# Patient Record
Sex: FEMALE | Race: WHITE | URBAN - NONMETROPOLITAN AREA
[De-identification: names, ages, dates, MRNs, and addresses within clinical notes are randomized per-mention and may not be internally consistent; named-entity substitution may affect disease eponyms.]

---

## 2022-08-24 ENCOUNTER — OFFICE VISIT (OUTPATIENT)
Dept: NEUROSURGERY | Age: 67
End: 2022-08-24
Payer: COMMERCIAL

## 2022-08-24 VITALS
DIASTOLIC BLOOD PRESSURE: 69 MMHG | HEIGHT: 60 IN | TEMPERATURE: 98.9 F | BODY MASS INDEX: 36.91 KG/M2 | SYSTOLIC BLOOD PRESSURE: 125 MMHG | WEIGHT: 188 LBS | HEART RATE: 51 BPM | OXYGEN SATURATION: 100 %

## 2022-08-24 DIAGNOSIS — G44.89 OTHER HEADACHE SYNDROME: Primary | ICD-10-CM

## 2022-08-24 DIAGNOSIS — R42 DIZZINESS: ICD-10-CM

## 2022-08-24 DIAGNOSIS — R41.3 MEMORY CHANGE: ICD-10-CM

## 2022-08-24 PROCEDURE — 1036F TOBACCO NON-USER: CPT | Performed by: NURSE PRACTITIONER

## 2022-08-24 PROCEDURE — 99204 OFFICE O/P NEW MOD 45 MIN: CPT | Performed by: NURSE PRACTITIONER

## 2022-08-24 PROCEDURE — 1123F ACP DISCUSS/DSCN MKR DOCD: CPT | Performed by: NURSE PRACTITIONER

## 2022-08-24 PROCEDURE — G8417 CALC BMI ABV UP PARAM F/U: HCPCS | Performed by: NURSE PRACTITIONER

## 2022-08-24 PROCEDURE — G8400 PT W/DXA NO RESULTS DOC: HCPCS | Performed by: NURSE PRACTITIONER

## 2022-08-24 PROCEDURE — G8427 DOCREV CUR MEDS BY ELIG CLIN: HCPCS | Performed by: NURSE PRACTITIONER

## 2022-08-24 PROCEDURE — 3017F COLORECTAL CA SCREEN DOC REV: CPT | Performed by: NURSE PRACTITIONER

## 2022-08-24 PROCEDURE — 1090F PRES/ABSN URINE INCON ASSESS: CPT | Performed by: NURSE PRACTITIONER

## 2022-08-24 RX ORDER — PROPRANOLOL HYDROCHLORIDE 160 MG/1
160 CAPSULE, EXTENDED RELEASE ORAL DAILY
COMMUNITY
Start: 2022-02-14

## 2022-08-24 RX ORDER — MECLIZINE HYDROCHLORIDE 25 MG/1
TABLET ORAL
COMMUNITY
Start: 2022-08-01

## 2022-08-24 RX ORDER — HYDROXYZINE PAMOATE 50 MG/1
CAPSULE ORAL
COMMUNITY
Start: 2022-03-17

## 2022-08-24 RX ORDER — CALCIUM CARBONATE/VITAMIN D3 600 MG-10
TABLET ORAL DAILY
COMMUNITY

## 2022-08-24 RX ORDER — ATORVASTATIN CALCIUM 10 MG/1
TABLET, FILM COATED ORAL
COMMUNITY
Start: 2022-08-11

## 2022-08-24 RX ORDER — ALPRAZOLAM 0.25 MG/1
1 TABLET ORAL 2 TIMES DAILY
COMMUNITY
Start: 2022-05-26

## 2022-08-24 RX ORDER — ROPINIROLE 1 MG/1
TABLET, FILM COATED ORAL
COMMUNITY
Start: 2022-04-22

## 2022-08-24 RX ORDER — UBROGEPANT 100 MG/1
TABLET ORAL
Qty: 16 TABLET | Refills: 3 | Status: SHIPPED | OUTPATIENT
Start: 2022-08-24

## 2022-08-24 RX ORDER — FLUOXETINE HYDROCHLORIDE 20 MG/1
CAPSULE ORAL
COMMUNITY
Start: 2022-06-08

## 2022-08-24 RX ORDER — TORSEMIDE 20 MG/1
20 TABLET ORAL DAILY
COMMUNITY
Start: 2021-11-23

## 2022-11-22 ENCOUNTER — OFFICE VISIT (OUTPATIENT)
Dept: NEUROLOGY | Age: 67
End: 2022-11-22
Payer: MEDICARE

## 2022-11-22 VITALS
WEIGHT: 188 LBS | OXYGEN SATURATION: 92 % | DIASTOLIC BLOOD PRESSURE: 81 MMHG | BODY MASS INDEX: 36.91 KG/M2 | SYSTOLIC BLOOD PRESSURE: 137 MMHG | HEIGHT: 60 IN | HEART RATE: 56 BPM

## 2022-11-22 DIAGNOSIS — R53.83 OTHER FATIGUE: ICD-10-CM

## 2022-11-22 DIAGNOSIS — F07.81 POST CONCUSSIVE SYNDROME: ICD-10-CM

## 2022-11-22 DIAGNOSIS — F07.81 POST CONCUSSIVE SYNDROME: Primary | ICD-10-CM

## 2022-11-22 LAB
ALBUMIN SERPL-MCNC: 4.9 G/DL (ref 3.5–5.2)
ALP BLD-CCNC: 110 U/L (ref 35–104)
ALT SERPL-CCNC: 24 U/L (ref 5–33)
ANION GAP SERPL CALCULATED.3IONS-SCNC: 15 MMOL/L (ref 7–19)
AST SERPL-CCNC: 24 U/L (ref 5–32)
BASOPHILS ABSOLUTE: 0.1 K/UL (ref 0–0.2)
BASOPHILS RELATIVE PERCENT: 0.8 % (ref 0–1)
BILIRUB SERPL-MCNC: 0.5 MG/DL (ref 0.2–1.2)
BUN BLDV-MCNC: 35 MG/DL (ref 8–23)
CALCIUM SERPL-MCNC: 9.9 MG/DL (ref 8.8–10.2)
CHLORIDE BLD-SCNC: 100 MMOL/L (ref 98–111)
CO2: 26 MMOL/L (ref 22–29)
CREAT SERPL-MCNC: 1.1 MG/DL (ref 0.5–0.9)
EOSINOPHILS ABSOLUTE: 0.4 K/UL (ref 0–0.6)
EOSINOPHILS RELATIVE PERCENT: 5.8 % (ref 0–5)
GFR SERPL CREATININE-BSD FRML MDRD: 55 ML/MIN/{1.73_M2}
GLUCOSE BLD-MCNC: 89 MG/DL (ref 74–109)
HCT VFR BLD CALC: 48.1 % (ref 37–47)
HEMOGLOBIN: 14.9 G/DL (ref 12–16)
HIV-1 P24 AG: NORMAL
IMMATURE GRANULOCYTES #: 0 K/UL
LYMPHOCYTES ABSOLUTE: 2.3 K/UL (ref 1.1–4.5)
LYMPHOCYTES RELATIVE PERCENT: 35.2 % (ref 20–40)
MCH RBC QN AUTO: 27.7 PG (ref 27–31)
MCHC RBC AUTO-ENTMCNC: 31 G/DL (ref 33–37)
MCV RBC AUTO: 89.6 FL (ref 81–99)
MONOCYTES ABSOLUTE: 0.5 K/UL (ref 0–0.9)
MONOCYTES RELATIVE PERCENT: 7 % (ref 0–10)
NEUTROPHILS ABSOLUTE: 3.4 K/UL (ref 1.5–7.5)
NEUTROPHILS RELATIVE PERCENT: 51 % (ref 50–65)
PDW BLD-RTO: 13.2 % (ref 11.5–14.5)
PLATELET # BLD: 270 K/UL (ref 130–400)
PMV BLD AUTO: 10.9 FL (ref 9.4–12.3)
POTASSIUM SERPL-SCNC: 4.4 MMOL/L (ref 3.5–5)
RAPID HIV 1&2: NORMAL
RBC # BLD: 5.37 M/UL (ref 4.2–5.4)
SEDIMENTATION RATE, ERYTHROCYTE: 14 MM/HR (ref 0–25)
SODIUM BLD-SCNC: 141 MMOL/L (ref 136–145)
TOTAL PROTEIN: 8.4 G/DL (ref 6.6–8.7)
TSH REFLEX FT4: 1.72 UIU/ML (ref 0.35–5.5)
VITAMIN B-12: 488 PG/ML (ref 211–946)
WBC # BLD: 6.6 K/UL (ref 4.8–10.8)

## 2022-11-22 PROCEDURE — 99213 OFFICE O/P EST LOW 20 MIN: CPT | Performed by: NURSE PRACTITIONER

## 2022-11-22 PROCEDURE — G8484 FLU IMMUNIZE NO ADMIN: HCPCS | Performed by: NURSE PRACTITIONER

## 2022-11-22 PROCEDURE — 1123F ACP DISCUSS/DSCN MKR DOCD: CPT | Performed by: NURSE PRACTITIONER

## 2022-11-22 PROCEDURE — 3017F COLORECTAL CA SCREEN DOC REV: CPT | Performed by: NURSE PRACTITIONER

## 2022-11-22 PROCEDURE — 1090F PRES/ABSN URINE INCON ASSESS: CPT | Performed by: NURSE PRACTITIONER

## 2022-11-22 PROCEDURE — G8400 PT W/DXA NO RESULTS DOC: HCPCS | Performed by: NURSE PRACTITIONER

## 2022-11-22 PROCEDURE — G8417 CALC BMI ABV UP PARAM F/U: HCPCS | Performed by: NURSE PRACTITIONER

## 2022-11-22 PROCEDURE — 1036F TOBACCO NON-USER: CPT | Performed by: NURSE PRACTITIONER

## 2022-11-22 PROCEDURE — G8427 DOCREV CUR MEDS BY ELIG CLIN: HCPCS | Performed by: NURSE PRACTITIONER

## 2022-11-22 NOTE — PROGRESS NOTES
[facility] Office Note      Patient:   Abner Bello  MR#:    [id number]  Account Number:                         YOB: 1955  Date of Evaluation:  11/22/2022  Time of Note:                          12:41 PM  Primary/Referring Physician:  Leena Vazquez MD   Consulting Physician:  Heaven Olvera DNP, APRN    FOLLOW UP    Chief Complaint   Patient presents with    Follow-up     C/o blurred vision at times lasting a few seconds and loss of balance. Headache and slurred speech seems to be better    Headache     HISTORY OF PRESENT ILLNESS    Abner Bello is a 78y.o. year old female here for follow up of dizziness, falls, gait changes, headaches. Has noted some improvement in speech but otherwise largely unchanged. She has noted these symptoms increasing since May 2022, had a fall and struck her head on the nightstand. No clear LOC. Headaches are about the same. Headache pain is posterior with little radiation of pain. She denies nausea, vomiting, light/sound sensitivity. Denies vision changes with headaches. Denies focal symptoms with headaches.  has noted slurred speech with more severe headaches. She tried [place] and [place] but no benefit. She will take Ibuprofen for milder headaches and Tylenol #3 for more severe headaches. On Propranolol for chronic migraine history. She has tried and failed Imitrex but had a severe reaction to this. Dizziness has improved but continues to feel quite imbalanced. She has had 1-2 falls since last visit. Has had some memory concerns as well. She veers to the left when imbalanced. Does not correlate dizziness with imbalance. Continues to note memory changes. Primarily short term memory affected. Might forget her thought in the middle of talking, word finding difficulty. Feels like she gets lost in the conversation sometimes. No issues with ADLs. Taking medications without difficulty. No family history of memory loss.  She had a recent MRI brain that was c/w , atrophy, nothing acute. Past Medical History:   Diagnosis Date    Hyperlipidemia        Past Surgical History:   Procedure Laterality Date    APPENDECTOMY      GASTRIC MOTILITY STUDY      HYSTERECTOMY, VAGINAL         History reviewed. No pertinent family history. Social History     Socioeconomic History    Marital status: Unknown     Spouse name: Not on file    Number of children: Not on file    Years of education: Not on file    Highest education level: Not on file   Occupational History    Not on file   Tobacco Use    Smoking status: Never    Smokeless tobacco: Never   Vaping Use    Vaping Use: Never used   Substance and Sexual Activity    Alcohol use: Not Currently    Drug use: Never    Sexual activity: Not on file   Other Topics Concern    Not on file   Social History Narrative    Not on file     Social Determinants of Health     Financial Resource Strain: Not on file   Food Insecurity: Not on file   Transportation Needs: Not on file   Physical Activity: Not on file   Stress: Not on file   Social Connections: Not on file   Intimate Partner Violence: Not on file   Housing Stability: Not on file       Current Outpatient Medications   Medication Sig Dispense Refill    propranolol (INDERAL LA) 160 MG extended release capsule Take 160 mg by mouth daily      FLUoxetine (PROZAC) 20 MG capsule TAKE 1 CAPSULE BY MOUTH TWICE DAILY      atorvastatin (LIPITOR) 10 MG tablet TAKE 1 TABLET (10 MG TOTAL) BY MOUTH DAILY      ALPRAZolam (XANAX) 0.25 MG tablet Take 1 tablet by mouth 2 times daily.       Multiple Vitamin (ONCE DAILY) TABS Take 1 tablet by mouth daily      rOPINIRole (REQUIP) 1 MG tablet TAKE 1 TABLET BY MOUTH NIGHTLY      meclizine (ANTIVERT) 25 MG tablet TAKE 1 TABLET (25 MG TOTAL) BY MOUTH 2 (TWO) TIMES A DAY AS NEEDED FOR DIZZINESS      hydrOXYzine pamoate (VISTARIL) 50 MG capsule TAKE 1 CAPSULE BY MOUTH THREE TIMES A DAY AS NEEDED      torsemide (DEMADEX) 20 MG tablet Take 20 mg by mouth daily No current facility-administered medications for this visit. Allergies   Allergen Reactions    Sulfa Antibiotics Hives      -     Lorazepam      depression   -       Meclofenamate       -      REVIEW OF SYSTEMS  Constitutional: []Fever []Sweats []Chills [] Recent Injury [x] Denies all unless marked  HEENT:[x]Headache  [] Head Injury [] Hearing Loss  [] Sore Throat  [] Ear Ache [x] Denies all unless marked  Spine:  [] Neck pain  [] Back pain  [] Sciaticia  [x] Denies all unless marked  Cardiovascular:[]Heart Disease []Palpitations [] Chest Pain   [x] Denies all unless marked  Pulmonary: []Shortness of Breath []Cough   [x] Denies all unless marked  Psychiatric/Behavioral:[] Depression [x] Anxiety [x] Denies all unless marked  Gastrointestinal: []Nausea  []Vomiting  []Abdominal Pain  []Constipation  []Diarrhea  [x] Denies all unless marked  Genitourinary:   [] Frequency  [] Urgency  [] Dysuria [] Incontinence  [x] Denies all unless marked  Extremities: []Pain  []Swelling  [x] Denies all unless marked  Musculoskeletal: [] Myalgias  [] Joint Pain  [] Arthritis [] Muscle Cramps [] Muscle Twitches  [x] Denies all unless marked  Sleep: []Insomnia[]Snoring []Restless Legs  []Sleep Apnea  []Daytime Sleepiness  [x] Denies all unless marked  Skin:[] Rash [] Color Change [x] Denies all unless marked   Neurological:[]Visual Disturbance [] Memory Loss []Loss of Balance []Slurred Speech []Weakness []Seizures  [] Dizziness [x] Denies all unless marked     The MA has completed the ROS with the patient. I have reviewed it in its' entirety with the patient and agree with the documentation.      PHYSICAL EXAM  /81   Pulse 56   Ht 5' (1.524 m)   Wt 188 lb (85.3 kg)   SpO2 92%   BMI 36.72 kg/m²       Constitutional - No acute distress    HEENT- Conjunctiva normal.  No scars, masses, or lesions over external nose or ears, no neck masses noted, no jugular vein distension, no bruit  Cardiac- Regular rate and rhythm  Pulmonary- Good expansion, normal effort without use of accessory muscles  Musculoskeletal - No significant wasting of muscles noted, no bony deformities  Extremities - No clubbing, cyanosis or edema  Skin - Warm, dry, and intact. No rash, erythema, or pallor  Psychiatric - Mood, affect, and behavior appear normal      NEUROLOGICAL EXAM     Mental status   [x] Awake, alert, oriented   [x]Affect attention and concentration appear appropriate  [x]Recent and remote memory appears unremarkable  [x]Speech normal without dysarthria or aphasia, comprehension and repetition intact. COMMENTS:    Cranial Nerves [x]No VF deficit to confrontation,  no papilledema on fundoscopic exam.  [x]PERRLA, EOMI, no nystagmus, conjugate eye movements, no ptosis  [x]Face symmetric  [x]Facial sensation intact  [x]Tongue midline no atrophy or fasciculations present  [x]Palate midline, hearing to finger rub normal bilaterally  [x]Shoulder shrug and SCM testing normal bilaterally  COMMENTS:   Motor   [x]5/5 strength x 4 extremities  [x]Normal bulk and tone  [x]No tremor present  [x]No rigidity or bradykinesia noted  COMMENTS:   Sensory  [x]Sensation intact to light touch, pin prick, vibration, and proprioception BLE  [x]Sensation intact to light touch, pin prick, vibration, and proprioception BUE  COMMENTS:   Coordination [x]FTN normal bilaterally   [x]HTS normal bilaterally  [x]PLACIDO normal bilaterally.    COMMENTS:   Reflexes  [x]Symmetric and non-pathological  [x]Toes down going bilaterally  [x]No clonus present  COMMENTS:   Gait                  [x]Normal steady gait    []Ataxic    []Spastic     []Magnetic     []Shuffling  COMMENTS:       LABS RECORD AND IMAGING REVIEW (As below and per HPI)    Lab Results   Component Value Date    [id number] 488 11/22/2022     Lab Results   Component Value Date    WBC 6.6 11/22/2022    HGB 14.9 11/22/2022    HCT 48.1 (H) 11/22/2022    MCV 89.6 11/22/2022     11/22/2022     Lab Results Component Value Date     2022    K 4.4 2022     2022    CO2 26 2022    BUN 35 (H) 2022    CREATININE 1.1 (H) 2022    GLUCOSE 89 2022    CALCIUM 9.9 2022    PROT 8.4 2022    LABALBU 4.9 2022    BILITOT 0.5 2022    ALKPHOS 110 (H) 2022    AST 24 2022    ALT 24 2022    LABGLOM 55 (A) 2022     No results found for: CHOL, TRIG, HDL, LDLCALC  No results found for: TSH, T4FREE  Lab Results   Component Value Date    SEDRATE 14 2022        MRI brain (2022)- ; mild to moderate atrophy; partially empty sella     Reviewed referral records     ASSESSMENT:    Cisco Chan is a 78y.o. year old female here for follow up of headaches, dizziness, memory changes and abnormal MRI brain. She has a history of headaches. These along with the other symptoms worsened after a fall in May 2022. MRI brain with , partially empty sella. Suspect this is a benign finding given her age and lack of other symptoms. Symptoms are most suggestive of post concussive syndrome. Should improve with time although timeline is unclear. Will add additional labs today to ensure no other source to memory changes. Could consider adding alternative headache medication if no improvement. Patient is leaving later this week for an [place] trip so will hold off on this for now, patient hesitant to start new medication. ICD-10-CM    1. Post concussive syndrome  F07.81 CBC with Auto Differential     Comprehensive Metabolic Panel     HIV Screen     Vitamin B1, Whole Blood     Vitamin B12     TSH with Reflex to FT4     Sedimentation Rate     RPR      2. Other fatigue   R53.83 HIV Screen     TSH with Reflex to FT4        PLAN:   Additional labs as listed above   Continue Propranolol, long term med for migraine prophylaxis. Continue OTC medications or Tylenol #3 prn. No concern for medication overuse   Follow along clinically.  Discussed post concussion, symptoms will improve with time but timeline is unclear. 5.   Return in about 3 months (around 2/22/2023) for follow up, sooner if worsening.     Dianah Carrel DNP, APRN

## 2022-11-23 ENCOUNTER — TELEPHONE (OUTPATIENT)
Dept: NEUROLOGY | Age: 67
End: 2022-11-23

## 2022-11-23 NOTE — TELEPHONE ENCOUNTER
----- Message from MAMADOU Solano sent at 11/22/2022  4:20 PM CST -----  Kidney function is altered, needs to follow up with primary. I do not have any prior labs to compare to. Several other labs are pending.

## 2022-11-23 NOTE — TELEPHONE ENCOUNTER
Called patient per EG to give her theKidney function is altered, needs to follow up with primary. I do not have any prior labs to compare to. Several other labs are pending. results from recent labs also to follow up with her PCP   Would call with pending labs once they were resulted. Patient understood.

## 2022-11-24 LAB — RPR: NORMAL

## 2022-11-27 LAB — VITAMIN B1 WHOLE BLOOD: 198 NMOL/L (ref 70–180)

## 2022-12-01 ENCOUNTER — TELEPHONE (OUTPATIENT)
Dept: NEUROLOGY | Age: 67
End: 2022-12-01

## 2022-12-01 NOTE — TELEPHONE ENCOUNTER
----- Message from Adra Schirmer, APRN sent at 11/22/2022  4:20 PM CST -----  Kidney function is altered, needs to follow up with primary. I do not have any prior labs to compare to. Several other labs are pending.

## 2023-02-22 ENCOUNTER — OFFICE VISIT (OUTPATIENT)
Dept: NEUROLOGY | Age: 68
End: 2023-02-22
Payer: MEDICARE

## 2023-02-22 VITALS
HEART RATE: 55 BPM | OXYGEN SATURATION: 96 % | BODY MASS INDEX: 38.09 KG/M2 | SYSTOLIC BLOOD PRESSURE: 114 MMHG | DIASTOLIC BLOOD PRESSURE: 76 MMHG | HEIGHT: 60 IN | WEIGHT: 194 LBS

## 2023-02-22 DIAGNOSIS — R51.9 NONINTRACTABLE HEADACHE, UNSPECIFIED CHRONICITY PATTERN, UNSPECIFIED HEADACHE TYPE: ICD-10-CM

## 2023-02-22 DIAGNOSIS — F07.81 POST CONCUSSIVE SYNDROME: Primary | ICD-10-CM

## 2023-02-22 PROCEDURE — 3017F COLORECTAL CA SCREEN DOC REV: CPT | Performed by: NURSE PRACTITIONER

## 2023-02-22 PROCEDURE — 1123F ACP DISCUSS/DSCN MKR DOCD: CPT | Performed by: NURSE PRACTITIONER

## 2023-02-22 PROCEDURE — G8400 PT W/DXA NO RESULTS DOC: HCPCS | Performed by: NURSE PRACTITIONER

## 2023-02-22 PROCEDURE — 1036F TOBACCO NON-USER: CPT | Performed by: NURSE PRACTITIONER

## 2023-02-22 PROCEDURE — G8484 FLU IMMUNIZE NO ADMIN: HCPCS | Performed by: NURSE PRACTITIONER

## 2023-02-22 PROCEDURE — G8417 CALC BMI ABV UP PARAM F/U: HCPCS | Performed by: NURSE PRACTITIONER

## 2023-02-22 PROCEDURE — 99213 OFFICE O/P EST LOW 20 MIN: CPT | Performed by: NURSE PRACTITIONER

## 2023-02-22 PROCEDURE — G8427 DOCREV CUR MEDS BY ELIG CLIN: HCPCS | Performed by: NURSE PRACTITIONER

## 2023-02-22 PROCEDURE — 1090F PRES/ABSN URINE INCON ASSESS: CPT | Performed by: NURSE PRACTITIONER

## 2023-02-22 RX ORDER — MELATONIN 10 MG
10 CAPSULE ORAL NIGHTLY PRN
COMMUNITY

## 2023-02-22 RX ORDER — IBUPROFEN 200 MG
200 TABLET ORAL EVERY 6 HOURS PRN
COMMUNITY

## 2023-02-22 RX ORDER — DIPHENHYDRAMINE HCL 25 MG
25 CAPSULE ORAL EVERY 6 HOURS PRN
COMMUNITY

## 2023-02-22 RX ORDER — ACETAMINOPHEN AND CODEINE PHOSPHATE 300; 30 MG/1; MG/1
TABLET ORAL
COMMUNITY
Start: 2023-01-12

## 2023-02-22 NOTE — PROGRESS NOTES
REVIEW OF SYSTEMS    Constitutional: []Fever []Sweat []Chills [] Recent Injury [x] Denies all unless marked  HEENT:[x]Headache  [] Head Injury/Hearing Loss  [] Sore Throat  [] Ear Ache/Dizziness  [x] Denies all unless marked  Spine:  [] Neck pain  [] Back pain  [] Sciaticia  [x] Denies all unless marked  Cardiovascular:[]Heart Disease []Chest Pain [] Palpitations  [x] Denies all unless marked  Pulmonary: []Shortness of Breath []Cough   [x] Denies all unless marke  Gastrointestinal: [x]Nausea  []Vomiting  []Abdominal Pain  []Constipation  []Diarrhea  []Dark Bloody Stools  [x] Denies all unless marked  Psychiatric/Behavioral:[] Depression [] Anxiety [x] Denies all unless marked  Genitourinary:   [] Frequency  [] Urgency  [] Incontinence [] Pain with Urination  [x] Denies all unless marked  Extremities: []Pain  [x]Swelling  [x] Denies all unless marked  Musculoskeletal: [] Muscle Pain  [] Joint Pain  [] Arthritis [] Muscle Cramps [] Muscle Twitches  [x] Denies all unless marked  Sleep: [] Insomnia [] Snoring [] Restless Legs [] Sleep Apnea  [] Daytime Sleepiness  [x] Denies all unless marked  Skin:[] Rash [] Skin Discoloration [x] Denies all unless marked   Neurological: []Visual Disturbance/Memory Loss [] Loss of Balance [] Slurred Speech/Weakness [] Seizures  [] Vertigo/Dizziness [x] Denies all unless marked

## 2023-02-22 NOTE — PROGRESS NOTES
[id number] [facility]rology Office Note      Patient:   Arabella Diaz  MR#:    [id number]  Account Number:                         YOB: 1955  Date of Evaluation:  2/22/2023  Time of Note:                          11:09 AM  Primary/Referring Physician:  Graham Steven MD   Consulting Physician:  MAMADOU Acosta    FOLLOW UP    Chief Complaint   Patient presents with    Follow-up     Post concussive syndrome       HISTORY OF PRESENT ILLNESS    Arabella Diaz is a 78y.o. year old female here for follow up of post concussive syndrome. She is here today with , is followed by MAMADOU Hernandez. Both her and  report that she is greatly improved overall. She is no longer having issues with dizziness, falling, gait issues. Headaches are well controlled on propranolol 160 mg extended release. She reports a very mild headache a few times a week at most, is easily aborted by Tylenol 3 or ibuprofen. She also has had resolution of speech issues as well. Feels memory issues are improved and overall stable. All of the symptoms started in May 2022 after a fall. Headache characteristics are unchanged. There is not vision changes with headaches, no focal symptoms, she denies any nausea, vomiting, photophobia or phonophobia. She tried [place] and [place] before with no benefit. Has had reaction to Imitrex in the past.  Prior MRI brain noted small vessel disease, atrophy, no acute pathology. Past Medical History:   Diagnosis Date    Hyperlipidemia        Past Surgical History:   Procedure Laterality Date    APPENDECTOMY      GASTRIC MOTILITY STUDY      HYSTERECTOMY, VAGINAL         History reviewed. No pertinent family history.     Social History     Socioeconomic History    Marital status: Unknown     Spouse name: Not on file    Number of children: Not on file    Years of education: Not on file    Highest education level: Not on file   Occupational History    Not on file   Tobacco Use    Smoking status: Never    Smokeless tobacco: Never   Vaping Use    Vaping Use: Never used   Substance and Sexual Activity    Alcohol use: Not Currently    Drug use: Never    Sexual activity: Not on file   Other Topics Concern    Not on file   Social History Narrative    Not on file     Social Determinants of Health     Financial Resource Strain: Not on file   Food Insecurity: Not on file   Transportation Needs: Not on file   Physical Activity: Not on file   Stress: Not on file   Social Connections: Not on file   Intimate Partner Violence: Not on file   Housing Stability: Not on file       Current Outpatient Medications   Medication Sig Dispense Refill    acetaminophen-codeine (TYLENOL #3) 300-30 MG per tablet TAKE 1 TABLET BY MOUTH BY MOUTH EVERY 6 HOURS AS NEEDED FOR MODERATE PAIN (MAY CAUSE DROWSINESS)      vitamin D (CHOLECALCIFEROL) 125 MCG (5000 UT) CAPS capsule Take 10,000 Units by mouth daily      diphenhydrAMINE (BENADRYL) 25 MG capsule Take 25 mg by mouth every 6 hours as needed      ibuprofen (ADVIL;MOTRIN) 200 MG tablet Take 200 mg by mouth every 6 hours as needed      melatonin 10 MG CAPS capsule Take 10 mg by mouth nightly as needed      propranolol (INDERAL LA) 160 MG extended release capsule Take 160 mg by mouth daily      FLUoxetine (PROZAC) 20 MG capsule TAKE 1 CAPSULE BY MOUTH TWICE DAILY      atorvastatin (LIPITOR) 10 MG tablet TAKE 1 TABLET (10 MG TOTAL) BY MOUTH DAILY      ALPRAZolam (XANAX) 0.25 MG tablet Take 1 tablet by mouth 2 times daily.       Multiple Vitamin (ONCE DAILY) TABS Take 1 tablet by mouth daily      rOPINIRole (REQUIP) 1 MG tablet TAKE 1 TABLET BY MOUTH NIGHTLY      meclizine (ANTIVERT) 25 MG tablet TAKE 1 TABLET (25 MG TOTAL) BY MOUTH 2 (TWO) TIMES A DAY AS NEEDED FOR DIZZINESS      hydrOXYzine pamoate (VISTARIL) 50 MG capsule TAKE 1 CAPSULE BY MOUTH THREE TIMES A DAY AS NEEDED      torsemide (DEMADEX) 20 MG tablet Take 20 mg by mouth daily       No current facility-administered medications for this visit. Allergies   Allergen Reactions    Sulfa Antibiotics Hives      -     Lorazepam      depression   -       Meclofenamate Other (See Comments)      -    -    -          REVIEW OF SYSTEMS  Constitutional: []Fever []Sweat []Chills [] Recent Injury [x] Denies all unless marked  HEENT:[x]Headache  [] Head Injury/Hearing Loss  [] Sore Throat  [] Ear Ache/Dizziness  [x] Denies all unless marked  Spine:  [] Neck pain  [] Back pain  [] Sciaticia  [x] Denies all unless marked  Cardiovascular:[]Heart Disease []Chest Pain [] Palpitations  [x] Denies all unless marked  Pulmonary: []Shortness of Breath []Cough   [x] Denies all unless marke  Gastrointestinal: [x]Nausea  []Vomiting  []Abdominal Pain  []Constipation  []Diarrhea  []Dark Bloody Stools  [x] Denies all unless marked  Psychiatric/Behavioral:[] Depression [] Anxiety [x] Denies all unless marked  Genitourinary:   [] Frequency  [] Urgency  [] Incontinence [] Pain with Urination  [x] Denies all unless marked  Extremities: []Pain  [x]Swelling  [x] Denies all unless marked  Musculoskeletal: [] Muscle Pain  [] Joint Pain  [] Arthritis [] Muscle Cramps [] Muscle Twitches  [x] Denies all unless marked  Sleep: [] Insomnia [] Snoring [] Restless Legs [] Sleep Apnea  [] Daytime Sleepiness  [x] Denies all unless marked  Skin:[] Rash [] Skin Discoloration [x] Denies all unless marked   Neurological: []Visual Disturbance/Memory Loss [] Loss of Balance [] Slurred Speech/Weakness [] Seizures  [] Vertigo/Dizziness [x] Denies all unless marked    The MA has completed the ROS with the patient. I have reviewed it in its' entirety with the patient and agree with the documentation.      PHYSICAL EXAM  /76   Pulse 55   Ht 5' (1.524 m)   Wt 194 lb (88 kg)   SpO2 96%   BMI 37.89 kg/m²       Constitutional - No acute distress    HEENT- Conjunctiva normal.  No scars, masses, or lesions over external nose or ears, no neck masses noted, no jugular vein distension, no bruit  Cardiac- Regular rate and rhythm  Pulmonary- Good expansion, normal effort without use of accessory muscles  Musculoskeletal - No significant wasting of muscles noted, no bony deformities  Extremities - No clubbing, cyanosis or edema  Skin - Warm, dry, and intact. No rash, erythema, or pallor  Psychiatric - Mood, affect, and behavior appear normal      NEUROLOGICAL EXAM     Mental status   [x] Awake, alert, oriented   [x]Affect attention and concentration appear appropriate  [x]Recent and remote memory appears unremarkable  [x]Speech normal without dysarthria or aphasia, comprehension and repetition intact. COMMENTS:    Cranial Nerves [x]No VF deficit to confrontation  [x]PERRLA, EOMI, no nystagmus, conjugate eye movements, no ptosis  [x]Face symmetric  [x]Facial sensation intact  [x]Tongue midline no atrophy or fasciculations present  [x]Palate midline, hearing to finger rub normal bilaterally  [x]Shoulder shrug and SCM testing normal bilaterally  COMMENTS:   Motor   [x]5/5 strength x 4 extremities  [x]Normal bulk and tone  [x]No tremor present  [x]No rigidity or bradykinesia noted  COMMENTS:   Sensory  [x]Sensation intact to light touch, vibration, and proprioception BLE  [x]Sensation intact to light touch, vibration, and proprioception BUE  COMMENTS:   Coordination [x]FTN normal bilaterally   []HTS normal bilaterally  [x]PLACIDO normal bilaterally.    COMMENTS:   Reflexes  [x]Symmetric and non-pathological  []Toes down going bilaterally  [x]No clonus present  COMMENTS:   Gait                  [x]Normal steady gait    []Ataxic    []Spastic     []Magnetic     []Shuffling  COMMENTS:       LABS RECORD AND IMAGING REVIEW (As below and per HPI)    Lab Results   Component Value Date    [id number] 488 11/22/2022     Lab Results   Component Value Date    WBC 6.6 11/22/2022    HGB 14.9 11/22/2022    HCT 48.1 (H) 11/22/2022    MCV 89.6 11/22/2022     11/22/2022     Lab Results   Component Value Date     2022    K 4.4 2022     2022    CO2 26 2022    BUN 35 (H) 2022    CREATININE 1.1 (H) 2022    GLUCOSE 89 2022    CALCIUM 9.9 2022    PROT 8.4 2022    LABALBU 4.9 2022    BILITOT 0.5 2022    ALKPHOS 110 (H) 2022    AST 24 2022    ALT 24 2022    LABGLOM 55 (A) 2022     No results found for: CHOL, TRIG, HDL, LDLCALC  No results found for: TSH, T4FREE  Lab Results   Component Value Date    SEDRATE 14 2022          Reviewed records     ASSESSMENT:    You La is a 78y.o. year old female here for follow-up of postconcussive syndrome status post fall in May 2022. Here today with . She feels that she has improved overall. Has had overall resolution of majority of her symptoms. Is still having some mild headaches, 1 to 2-week at most that are easily aborted with either ibuprofen or Tylenol 3. She is still on propranolol for migraine headache prevention, no side effect issues with this and has benefit noted. Heart rate is in the 50s today, she is asymptomatic. Discussed this with her. We will just need to monitor heart rate. Prior MRI brain with , partially empty sella. Suspect this is a benign finding given her age and lack of other symptoms. Labs completed at prior visit were largely normal.  There was some abnormal renal labs, she is following up with primary care regarding this. ICD-10-CM    1. Post concussive syndrome  F07.81       2. Nonintractable headache, unspecified chronicity pattern, unspecified headache type  R51.9             PLAN:  Continue propranolol as prescribed. Monitor for issues related to bradycardia. Continue Tylenol #3 as needed, use sparingly. Follow-up with primary care regarding renal labs. 4. Return in about 6 months (around 2023) for Follow up, sooner with worsening symptoms, Schedule with Jaren Peterson.     This dictation was generated by voice recognition computer software. Although all attempts were made to edit the dictation for accuracy, there may be errors in the transcription that are not intended.   Sandra Sahu, APRN

## 2023-03-06 ENCOUNTER — TELEPHONE (OUTPATIENT)
Dept: NEUROLOGY | Age: 68
End: 2023-03-06

## 2023-03-06 DIAGNOSIS — R47.89 WORD FINDING DIFFICULTY: Primary | ICD-10-CM

## 2023-03-06 NOTE — TELEPHONE ENCOUNTER
Lets repeat MRI brain to ensure nothing new is going on. Orders placed. If it happens again then may need to consider ER evaluation. Sooner follow up after MRI.

## 2023-03-06 NOTE — TELEPHONE ENCOUNTER
Called pt gave her an update on plan. She voiced understanding and thanked us for the call. Pt is aware if anymore episodes she will got to the ER.  Pt transferred to scheduling for MRI

## 2023-03-06 NOTE — TELEPHONE ENCOUNTER
Patient called complaining of having another episode yesterday. Patient reports, not being able to speak and get words out. Patient states that she was walking into things on her right side.  Patient is concerned and wanted to make Radha Gone aware and see if she has any advise

## 2023-03-07 NOTE — TELEPHONE ENCOUNTER
Patient l/m today stating that her MRI is not scheduled her for another couple of weeks. She would like to see if she can obtain her MRI at a facility in [facility], she states they can squeeze her in this Thursday at 6pm if it's ok to get done there. Would need her order faxed and if pre-cert is needed that would have to be updated or obtained asap. She would like a call back to discuss.  Fax number for the facility she wants to use  is [phone]

## 2023-03-08 NOTE — TELEPHONE ENCOUNTER
Spoke with Sasha Peters this morning to let her know that I would work on getting Authorization for her to have MRI at [facility] in [facility], and if I was to have any problems I would call her back. Sasha Peters voiced understanding.

## 2023-03-08 NOTE — TELEPHONE ENCOUNTER
Spoke with Umm Williamson to let her know that I faxed MRI Order to [facility] and that she was all set for her appointment for 3/9/2023. Umm Williamson asked me to cancel the MRI that is scheduled at [facility] for 3/28/2023.  I have cancelled appointment per patient request

## 2023-03-16 ENCOUNTER — TELEPHONE (OUTPATIENT)
Dept: NEUROLOGY | Age: 68
End: 2023-03-16

## 2023-03-29 ENCOUNTER — OFFICE VISIT (OUTPATIENT)
Dept: NEUROLOGY | Age: 68
End: 2023-03-29
Payer: MEDICARE

## 2023-03-29 VITALS
SYSTOLIC BLOOD PRESSURE: 128 MMHG | DIASTOLIC BLOOD PRESSURE: 80 MMHG | BODY MASS INDEX: 38.09 KG/M2 | OXYGEN SATURATION: 95 % | HEART RATE: 53 BPM | HEIGHT: 60 IN | WEIGHT: 194 LBS

## 2023-03-29 DIAGNOSIS — G45.9 TIA (TRANSIENT ISCHEMIC ATTACK): Primary | ICD-10-CM

## 2023-03-29 DIAGNOSIS — I67.841 REVERSIBLE CEREBROVASCULAR VASOCONSTRICTION SYNDROME: ICD-10-CM

## 2023-03-29 PROCEDURE — G8427 DOCREV CUR MEDS BY ELIG CLIN: HCPCS | Performed by: NURSE PRACTITIONER

## 2023-03-29 PROCEDURE — 1123F ACP DISCUSS/DSCN MKR DOCD: CPT | Performed by: NURSE PRACTITIONER

## 2023-03-29 PROCEDURE — G8417 CALC BMI ABV UP PARAM F/U: HCPCS | Performed by: NURSE PRACTITIONER

## 2023-03-29 PROCEDURE — G8484 FLU IMMUNIZE NO ADMIN: HCPCS | Performed by: NURSE PRACTITIONER

## 2023-03-29 PROCEDURE — 99214 OFFICE O/P EST MOD 30 MIN: CPT | Performed by: NURSE PRACTITIONER

## 2023-03-29 PROCEDURE — 1036F TOBACCO NON-USER: CPT | Performed by: NURSE PRACTITIONER

## 2023-03-29 PROCEDURE — 3017F COLORECTAL CA SCREEN DOC REV: CPT | Performed by: NURSE PRACTITIONER

## 2023-03-29 PROCEDURE — 1090F PRES/ABSN URINE INCON ASSESS: CPT | Performed by: NURSE PRACTITIONER

## 2023-03-29 PROCEDURE — G8400 PT W/DXA NO RESULTS DOC: HCPCS | Performed by: NURSE PRACTITIONER

## 2023-03-29 NOTE — PROGRESS NOTES
REVIEW OF SYSTEMS    Constitutional: []Fever []Sweats []Chills [] Recent Injury [x] Denies all unless marked  HEENT:[]Headache  [] Head Injury [] Hearing Loss  [] Sore Throat  [] Ear Ache [x] Denies all unless marked  Spine:  [] Neck pain  [] Back pain  [] Sciaticia  [x] Denies all unless marked  Cardiovascular:[]Heart Disease []Palpitations [] Chest Pain   [x] Denies all unless marked  Pulmonary: []Shortness of Breath []Cough   [x] Denies all unless marked  Psychiatric/Behavioral:[] Depression [] Anxiety [x] Denies all unless marked  Gastrointestinal: []Nausea  []Vomiting  []Abdominal Pain  []Constipation  []Diarrhea  [x] Denies all unless marked  Genitourinary:   [] Frequency  [] Urgency  [] Dysuria [] Incontinence  [x] Denies all unless marked  Extremities: []Pain  []Swelling  [x] Denies all unless marked  Musculoskeletal: [] Myalgias  [] Joint Pain  [] Arthritis [] Muscle Cramps [] Muscle Twitches  [x] Denies all unless marked  Sleep: []Insomnia[]Snoring []Restless Legs  []Sleep Apnea  []Daytime Sleepiness  [x] Denies all unless marked  Skin:[] Rash [] Color Change [x] Denies all unless marked   Neurological:[]Visual Disturbance [] Memory Loss []Loss of Balance []Slurred Speech []Weakness []Seizures  [] Dizziness [x] Denies all unless marked
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 Monitor for issues related to bradycardia. Continue Tylenol #3 as needed, use sparingly. Return in about 2 months (around 5/29/2023) for follow up, sooner if worsening.     Zheng Dears DNP, APRN

## 2023-04-07 ENCOUNTER — HOSPITAL ENCOUNTER (OUTPATIENT)
Dept: NON INVASIVE DIAGNOSTICS | Age: 68
Discharge: HOME | End: 2023-04-07
Payer: MEDICARE

## 2023-04-07 ENCOUNTER — HOSPITAL ENCOUNTER (OUTPATIENT)
Dept: VASCULAR LAB | Age: 68
Discharge: HOME | End: 2023-04-07
Payer: MEDICARE

## 2023-04-07 DIAGNOSIS — G45.9 TIA (TRANSIENT ISCHEMIC ATTACK): ICD-10-CM

## 2023-04-07 DIAGNOSIS — I67.841 REVERSIBLE CEREBROVASCULAR VASOCONSTRICTION SYNDROME: ICD-10-CM

## 2023-04-07 LAB
LV EF: 60 %
LVEF MODALITY: NORMAL

## 2023-04-07 PROCEDURE — 6360000004 HC RX CONTRAST MEDICATION: Performed by: NURSE PRACTITIONER

## 2023-04-07 PROCEDURE — 93880 EXTRACRANIAL BILAT STUDY: CPT

## 2023-04-07 PROCEDURE — C8929 TTE W OR WO FOL WCON,DOPPLER: HCPCS

## 2023-04-07 PROCEDURE — 93246 EXT ECG>7D<15D RECORDING: CPT

## 2023-04-07 RX ADMIN — PERFLUTREN 1.5 ML: 6.52 INJECTION, SUSPENSION INTRAVENOUS at 08:36

## 2023-05-25 ENCOUNTER — CLINICAL DOCUMENTATION (OUTPATIENT)
Dept: NEUROLOGY | Age: 68
End: 2023-05-25

## 2023-05-25 ENCOUNTER — OFFICE VISIT (OUTPATIENT)
Dept: NEUROLOGY | Age: 68
End: 2023-05-25
Payer: MEDICARE

## 2023-05-25 VITALS
WEIGHT: 194 LBS | OXYGEN SATURATION: 100 % | DIASTOLIC BLOOD PRESSURE: 70 MMHG | HEIGHT: 60 IN | BODY MASS INDEX: 38.09 KG/M2 | HEART RATE: 51 BPM | SYSTOLIC BLOOD PRESSURE: 112 MMHG

## 2023-05-25 DIAGNOSIS — G45.9 TIA (TRANSIENT ISCHEMIC ATTACK): Primary | ICD-10-CM

## 2023-05-25 DIAGNOSIS — G44.59 HEADACHE SYNDROME, COMPLICATED: ICD-10-CM

## 2023-05-25 PROCEDURE — G8427 DOCREV CUR MEDS BY ELIG CLIN: HCPCS | Performed by: NURSE PRACTITIONER

## 2023-05-25 PROCEDURE — 1123F ACP DISCUSS/DSCN MKR DOCD: CPT | Performed by: NURSE PRACTITIONER

## 2023-05-25 PROCEDURE — G8417 CALC BMI ABV UP PARAM F/U: HCPCS | Performed by: NURSE PRACTITIONER

## 2023-05-25 PROCEDURE — 3017F COLORECTAL CA SCREEN DOC REV: CPT | Performed by: NURSE PRACTITIONER

## 2023-05-25 PROCEDURE — 99213 OFFICE O/P EST LOW 20 MIN: CPT | Performed by: NURSE PRACTITIONER

## 2023-05-25 PROCEDURE — G8400 PT W/DXA NO RESULTS DOC: HCPCS | Performed by: NURSE PRACTITIONER

## 2023-05-25 PROCEDURE — 1036F TOBACCO NON-USER: CPT | Performed by: NURSE PRACTITIONER

## 2023-05-25 PROCEDURE — 1090F PRES/ABSN URINE INCON ASSESS: CPT | Performed by: NURSE PRACTITIONER

## 2023-05-25 RX ORDER — CHOLECALCIFEROL (VITAMIN D3) 1250 MCG
CAPSULE ORAL 2 TIMES DAILY
COMMUNITY

## 2023-05-25 NOTE — PROGRESS NOTES
Procedures: MRA Head   Primary Coverage: Medicare A&B  Secondary Coverage Requires Auth:     Source:  Case/Tracking/Auth Ref #     Case Status: No Auth Required     Valid Dates if approved:      Additional Comments: Faxed to [address] [phone]  Fax# [phone]

## 2023-05-25 NOTE — PROGRESS NOTES
[facility] Neurology Office Note      Patient:   Kandice Monet  MR#:    [id number]  Account Number:                         YOB: 1955  Date of Evaluation:  5/25/2023  Time of Note:                          10:57 AM  Primary/Referring Physician:  MAMADOU Torrez - NP   Consulting Physician:  Lizz Small DNP, APRN    FOLLOW UP    Chief Complaint   Patient presents with    Follow-up    Transient Ischemic Attack     Was in hospital with TIA symptoms at the beginning of May- records in care everywhere    Headache     Headache are better      [address] is a 78y.o. year old female here for follow up. She was seen at OSH for episode of slurred speech, word finding difficulty, imbalance. Denies weakness or numbness. She did have a headache with these symptoms as well. Symptoms lasted for about an hour and then resolved. She had a similar episode in March 2023 as well- imbalanced, slurred speech, word finding difficulty. Symptoms last for several hours that time and then resolved. Unclear if she had an associated headache with this episode. Outside of these episodes she has noted improvement overall. Headaches have improved. No change in characteristics of headaches. Headache pain is left sided, retro orbital/temporal in nature. She denies vision changes. Denies focal symptoms. She denies clear migrainous features. On Propranolol for preventative. She is taking OTC medications or Tylenol #3 with improvement. She tried [place] and [place] before with no benefit. Has had reaction to Imitrex in the past. She has noted some dizziness, imbalance that will worsen when she looks up typically. She had an increase in headaches/dizziness following a fall in May 2022. No prior stroke history noted, aspirin and statin.      Past Medical History:   Diagnosis Date    Hyperlipidemia        Past Surgical History:   Procedure Laterality Date    APPENDECTOMY      GASTRIC MOTILITY STUDY

## 2023-05-30 ENCOUNTER — TELEPHONE (OUTPATIENT)
Dept: NEUROSURGERY | Age: 68
End: 2023-05-30

## 2023-05-30 NOTE — TELEPHONE ENCOUNTER
Call was placed to the patient in regards to phone tree, apologized to the patient and informed to call with any further questions. Patient understood.

## 2023-06-28 ENCOUNTER — TELEPHONE (OUTPATIENT)
Dept: NEUROLOGY | Age: 68
End: 2023-06-28

## 2023-08-22 ENCOUNTER — OFFICE VISIT (OUTPATIENT)
Dept: NEUROLOGY | Age: 68
End: 2023-08-22
Payer: MEDICARE

## 2023-08-22 VITALS — BODY MASS INDEX: 38.09 KG/M2 | HEIGHT: 60 IN | WEIGHT: 194 LBS

## 2023-08-22 DIAGNOSIS — R42 DIZZINESS: ICD-10-CM

## 2023-08-22 DIAGNOSIS — R51.9 NONINTRACTABLE HEADACHE, UNSPECIFIED CHRONICITY PATTERN, UNSPECIFIED HEADACHE TYPE: Primary | ICD-10-CM

## 2023-08-22 PROCEDURE — 3017F COLORECTAL CA SCREEN DOC REV: CPT | Performed by: NURSE PRACTITIONER

## 2023-08-22 PROCEDURE — 99213 OFFICE O/P EST LOW 20 MIN: CPT | Performed by: NURSE PRACTITIONER

## 2023-08-22 PROCEDURE — 1090F PRES/ABSN URINE INCON ASSESS: CPT | Performed by: NURSE PRACTITIONER

## 2023-08-22 PROCEDURE — 1123F ACP DISCUSS/DSCN MKR DOCD: CPT | Performed by: NURSE PRACTITIONER

## 2023-08-22 PROCEDURE — G8417 CALC BMI ABV UP PARAM F/U: HCPCS | Performed by: NURSE PRACTITIONER

## 2023-08-22 PROCEDURE — 1036F TOBACCO NON-USER: CPT | Performed by: NURSE PRACTITIONER

## 2023-08-22 PROCEDURE — G8400 PT W/DXA NO RESULTS DOC: HCPCS | Performed by: NURSE PRACTITIONER

## 2023-08-22 PROCEDURE — G8427 DOCREV CUR MEDS BY ELIG CLIN: HCPCS | Performed by: NURSE PRACTITIONER

## 2023-08-22 RX ORDER — URSODIOL 300 MG/1
300 CAPSULE ORAL 2 TIMES DAILY
COMMUNITY
Start: 2023-08-16

## 2023-08-22 RX ORDER — ONDANSETRON 4 MG/1
1 TABLET, ORALLY DISINTEGRATING ORAL PRN
COMMUNITY
Start: 2023-07-13

## 2023-08-22 RX ORDER — OMEPRAZOLE 20 MG/1
20 CAPSULE, DELAYED RELEASE ORAL EVERY MORNING
COMMUNITY
Start: 2023-08-16

## 2023-08-22 RX ORDER — ATORVASTATIN CALCIUM 40 MG/1
1 TABLET, FILM COATED ORAL DAILY
COMMUNITY
Start: 2023-06-30

## 2023-08-22 NOTE — PROGRESS NOTES
[facility] Neurology Office Note      Patient:   Josey Vera  MR#:    [id number]  Account Number:                         YOB: 1955  Date of Evaluation:  8/22/2023  Time of Note:                          9:15 AM  Primary/Referring Physician:  MAMADOU London - NP   Consulting Physician:  Serge Santana DNP, APRN    FOLLOW UP    Chief Complaint   Patient presents with    6 Month Follow-Up    Transient Ischemic Attack    Results     Results from recent [address] is a 78y.o. year old female here for follow up. Overall improved from prior visit. Denies further episodes of slurred speech, word finding difficulty and imbalance. She had a headache with these symptoms. No focal symptoms. Symptoms lasted for about an hour and then resolved. Headaches are well controlled. No change in characteristics of headaches. Headache pain is left sided, retro orbital/temporal in nature. She denies vision changes. Denies focal symptoms. She denies clear migrainous features. On Propranolol for preventative. She is taking OTC medications or Tylenol #3 with improvement. Recently tried Nurtec samples with improvement. She tried [place] before with no benefit. Has had reaction to Imitrex in the past. Noting 1 headache in a month. Notes intermittent dizziness but overall improved. Worsens when she looks up typically. She had an increase in headaches/dizziness following a fall in May 2022. No prior stroke history noted, aspirin and statin. Past Medical History:   Diagnosis Date    Hyperlipidemia        Past Surgical History:   Procedure Laterality Date    APPENDECTOMY      GASTRIC MOTILITY STUDY      HYSTERECTOMY, VAGINAL         History reviewed. No pertinent family history.     Social History     Socioeconomic History    Marital status:      Spouse name: Not on file    Number of children: Not on file    Years of education: Not on file    Highest education

## 2024-06-27 ENCOUNTER — OFFICE VISIT (OUTPATIENT)
Dept: NEUROLOGY | Age: 69
End: 2024-06-27
Payer: MEDICARE

## 2024-06-27 VITALS
HEIGHT: 60 IN | BODY MASS INDEX: 38.09 KG/M2 | DIASTOLIC BLOOD PRESSURE: 70 MMHG | OXYGEN SATURATION: 97 % | HEART RATE: 47 BPM | WEIGHT: 194 LBS | SYSTOLIC BLOOD PRESSURE: 125 MMHG

## 2024-06-27 DIAGNOSIS — G44.59 HEADACHE SYNDROME, COMPLICATED: ICD-10-CM

## 2024-06-27 DIAGNOSIS — G45.9 TIA (TRANSIENT ISCHEMIC ATTACK): ICD-10-CM

## 2024-06-27 DIAGNOSIS — R51.9 NONINTRACTABLE HEADACHE, UNSPECIFIED CHRONICITY PATTERN, UNSPECIFIED HEADACHE TYPE: Primary | ICD-10-CM

## 2024-06-27 PROCEDURE — 1090F PRES/ABSN URINE INCON ASSESS: CPT | Performed by: NURSE PRACTITIONER

## 2024-06-27 PROCEDURE — 1123F ACP DISCUSS/DSCN MKR DOCD: CPT | Performed by: NURSE PRACTITIONER

## 2024-06-27 PROCEDURE — 3017F COLORECTAL CA SCREEN DOC REV: CPT | Performed by: NURSE PRACTITIONER

## 2024-06-27 PROCEDURE — 99213 OFFICE O/P EST LOW 20 MIN: CPT | Performed by: NURSE PRACTITIONER

## 2024-06-27 PROCEDURE — 1036F TOBACCO NON-USER: CPT | Performed by: NURSE PRACTITIONER

## 2024-06-27 PROCEDURE — G8427 DOCREV CUR MEDS BY ELIG CLIN: HCPCS | Performed by: NURSE PRACTITIONER

## 2024-06-27 PROCEDURE — G8417 CALC BMI ABV UP PARAM F/U: HCPCS | Performed by: NURSE PRACTITIONER

## 2024-06-27 PROCEDURE — G8400 PT W/DXA NO RESULTS DOC: HCPCS | Performed by: NURSE PRACTITIONER

## 2024-06-27 RX ORDER — ASPIRIN 81 MG/1
TABLET, CHEWABLE ORAL
COMMUNITY

## 2024-06-27 RX ORDER — FOLIC ACID/MULTIVIT,IRON,MINER 0.4MG-18MG
TABLET ORAL
COMMUNITY

## 2024-06-27 RX ORDER — SUCRALFATE 1 G/1
TABLET ORAL
COMMUNITY

## 2024-06-27 RX ORDER — PANTOPRAZOLE SODIUM 40 MG/1
40 TABLET, DELAYED RELEASE ORAL 2 TIMES DAILY
COMMUNITY
Start: 2024-06-14

## 2024-06-27 NOTE — PROGRESS NOTES
REVIEW OF SYSTEMS    Constitutional: []Fever []Sweats []Chills [] Recent Injury [x] Denies all unless marked  HEENT:[]Headache  [] Head Injury [] Hearing Loss  [] Sore Throat  [] Ear Ache [x] Denies all unless marked  Spine:  [] Neck pain  [] Back pain  [] Sciaticia  [x] Denies all unless marked  Cardiovascular:[]Heart Disease []Palpitations [] Chest Pain   [x] Denies all unless marked  Pulmonary: []Shortness of Breath []Cough   [x] Denies all unless marked  Psychiatric/Behavioral:[] Depression [] Anxiety [x] Denies all unless marked  Gastrointestinal: []Nausea  []Vomiting  []Abdominal Pain  []Constipation  []Diarrhea  [x] Denies all unless marked  Genitourinary:   [] Frequency  [] Urgency  [] Dysuria [] Incontinence  [x] Denies all unless marked  Extremities: []Pain  []Swelling  [x] Denies all unless marked  Musculoskeletal: [] Myalgias  [] Joint Pain  [] Arthritis [] Muscle Cramps [] Muscle Twitches  [x] Denies all unless marked  Sleep: []Insomnia[]Snoring []Restless Legs  []Sleep Apnea  []Daytime Sleepiness  [x] Denies all unless marked  Skin:[] Rash [] Color Change [x] Denies all unless marked   Neurological:[]Visual Disturbance [] Memory Loss []Loss of Balance []Slurred Speech []Weakness []Seizures  [] Dizziness [x] Denies all unless marked      
distress    HEENT- Conjunctiva normal.  No scars, masses, or lesions over external nose or ears, no neck masses noted, no jugular vein distension, no bruit  Cardiac- Regular rate and rhythm  Pulmonary- Good expansion, normal effort without use of accessory muscles  Musculoskeletal - No significant wasting of muscles noted, no bony deformities  Extremities - No clubbing, cyanosis or edema  Skin - Warm, dry, and intact.  No rash, erythema, or pallor  Psychiatric - Mood, affect, and behavior appear normal      NEUROLOGICAL EXAM     Mental status   [x] Awake, alert, oriented   [x]Affect attention and concentration appear appropriate  [x]Recent and remote memory appears unremarkable  [x]Speech normal without dysarthria or aphasia, comprehension and repetition intact.   COMMENTS:    Cranial Nerves [x]No VF deficit to confrontation  [x]PERRLA, EOMI, no nystagmus, conjugate eye movements, no ptosis  [x]Face symmetric  [x]Facial sensation intact  [x]Tongue midline no atrophy or fasciculations present  [x]Palate midline, hearing to finger rub normal bilaterally  [x]Shoulder shrug and SCM testing normal bilaterally  COMMENTS:   Motor   [x]5/5 strength x 4 extremities  [x]Normal bulk and tone  [x]No tremor present  [x]No rigidity or bradykinesia noted  COMMENTS:   Sensory  [x]Sensation intact to light touch, vibration, and proprioception BLE  [x]Sensation intact to light touch, vibration, and proprioception BUE  COMMENTS:   Coordination [x]FTN normal bilaterally   []HTS normal bilaterally  [x]PLACIDO normal bilaterally.   COMMENTS:   Reflexes  [x]Symmetric and non-pathological  []Toes down going bilaterally  [x]No clonus present  COMMENTS:   Gait                  [x]Normal steady gait    []Ataxic    []Spastic     []Magnetic     []Shuffling  COMMENTS:       LABS RECORD AND IMAGING REVIEW (As below and per HPI)    Lab Results   Component Value Date    [id number] 488 11/22/2022     Lab Results   Component Value Date    WBC 6.6

## 2025-01-05 ENCOUNTER — PATIENT MESSAGE (OUTPATIENT)
Dept: NEUROLOGY | Age: 70
End: 2025-01-05

## 2025-01-07 NOTE — TELEPHONE ENCOUNTER
Attempted to reschedule, no answer and can not leave v/m. Patient does have my chart for new scheduled appt and I will also send out a mail reminder